# Patient Record
Sex: MALE | Race: BLACK OR AFRICAN AMERICAN | NOT HISPANIC OR LATINO | Employment: UNEMPLOYED | RURAL
[De-identification: names, ages, dates, MRNs, and addresses within clinical notes are randomized per-mention and may not be internally consistent; named-entity substitution may affect disease eponyms.]

---

## 2020-08-10 ENCOUNTER — HISTORICAL (OUTPATIENT)
Dept: ADMINISTRATIVE | Facility: HOSPITAL | Age: 60
End: 2020-08-10

## 2020-08-10 LAB — SARS-COV+SARS-COV-2 AG RESP QL IA.RAPID: NEGATIVE

## 2022-09-28 DIAGNOSIS — M25.511 RIGHT SHOULDER PAIN, UNSPECIFIED CHRONICITY: Primary | ICD-10-CM

## 2022-09-29 ENCOUNTER — HOSPITAL ENCOUNTER (OUTPATIENT)
Dept: RADIOLOGY | Facility: HOSPITAL | Age: 62
Discharge: HOME | End: 2022-09-29
Attending: ORTHOPAEDIC SURGERY
Payer: COMMERCIAL

## 2022-09-29 ENCOUNTER — OFFICE VISIT (OUTPATIENT)
Dept: ORTHOPEDICS | Facility: CLINIC | Age: 62
End: 2022-09-29
Payer: COMMERCIAL

## 2022-09-29 DIAGNOSIS — S46.811A PARTIAL TEAR OF RIGHT SUBSCAPULARIS TENDON, INITIAL ENCOUNTER: Primary | ICD-10-CM

## 2022-09-29 DIAGNOSIS — M19.019 ACROMIOCLAVICULAR JOINT ARTHRITIS, UNSPECIFIED LATERALITY: ICD-10-CM

## 2022-09-29 DIAGNOSIS — M25.511 RIGHT SHOULDER PAIN, UNSPECIFIED CHRONICITY: ICD-10-CM

## 2022-09-29 DIAGNOSIS — M75.21 BICEPS TENDONITIS ON RIGHT: ICD-10-CM

## 2022-09-29 DIAGNOSIS — M75.101 TEAR OF RIGHT SUPRASPINATUS TENDON: ICD-10-CM

## 2022-09-29 PROCEDURE — 1160F RVW MEDS BY RX/DR IN RCRD: CPT | Mod: ,,, | Performed by: ORTHOPAEDIC SURGERY

## 2022-09-29 PROCEDURE — 73030 X-RAY EXAM OF SHOULDER: CPT | Mod: 26,RT,, | Performed by: ORTHOPAEDIC SURGERY

## 2022-09-29 PROCEDURE — 73030 X-RAY EXAM OF SHOULDER: CPT | Mod: TC,RT

## 2022-09-29 PROCEDURE — 4010F PR ACE/ARB THEARPY RXD/TAKEN: ICD-10-PCS | Mod: ,,, | Performed by: ORTHOPAEDIC SURGERY

## 2022-09-29 PROCEDURE — 1159F MED LIST DOCD IN RCRD: CPT | Mod: ,,, | Performed by: ORTHOPAEDIC SURGERY

## 2022-09-29 PROCEDURE — 1159F PR MEDICATION LIST DOCUMENTED IN MEDICAL RECORD: ICD-10-PCS | Mod: ,,, | Performed by: ORTHOPAEDIC SURGERY

## 2022-09-29 PROCEDURE — 73030 XR SHOULDER COMPLETE 2 OR MORE VIEWS RIGHT: ICD-10-PCS | Mod: 26,RT,, | Performed by: ORTHOPAEDIC SURGERY

## 2022-09-29 PROCEDURE — 99205 OFFICE O/P NEW HI 60 MIN: CPT | Mod: ,,, | Performed by: ORTHOPAEDIC SURGERY

## 2022-09-29 PROCEDURE — 99205 PR OFFICE/OUTPT VISIT, NEW, LEVL V, 60-74 MIN: ICD-10-PCS | Mod: ,,, | Performed by: ORTHOPAEDIC SURGERY

## 2022-09-29 PROCEDURE — 4010F ACE/ARB THERAPY RXD/TAKEN: CPT | Mod: ,,, | Performed by: ORTHOPAEDIC SURGERY

## 2022-09-29 PROCEDURE — 1160F PR REVIEW ALL MEDS BY PRESCRIBER/CLIN PHARMACIST DOCUMENTED: ICD-10-PCS | Mod: ,,, | Performed by: ORTHOPAEDIC SURGERY

## 2022-09-29 RX ORDER — LOSARTAN POTASSIUM 50 MG/1
TABLET ORAL
COMMUNITY

## 2022-09-29 RX ORDER — GLYBURIDE-METFORMIN HYDROCHLORIDE 5; 500 MG/1; MG/1
TABLET ORAL
COMMUNITY

## 2022-09-29 RX ORDER — INSULIN GLARGINE 300 U/ML
INJECTION, SOLUTION SUBCUTANEOUS
COMMUNITY

## 2022-09-29 NOTE — PROGRESS NOTES
"      HPI:   Deni Golden is a pleasant 62 y.o. patient who reports to clinic for evaluation of right shoulder pain.  Injury onset and description: pain has been present for about a year and a half; Patient has limited, painful ROM.  Patient's occupation: retired .  This is not a work related injury.   he has had formal physical therapy back in April. He states that did help briefly.  he has not had previous shoulder injections.   he has had advanced imaging such as MRI. Last month at [facility]  The shoulder pain worsens with activity and overhead motion and driving. Pain is disruptive to sleep at night.   The pain is better with rest. Treatment thus far has included rest and activity modification. Here today to discuss diagnosis and treatment options.   VAS Pain Scale:  3  SANE Score: 75    PAST MEDICAL HISTORY:   Past Medical History:   Diagnosis Date    Type 2 diabetes mellitus with unspecified diabetic retinopathy without macular edema      PAST SURGICAL HISTORY:   Past Surgical History:   Procedure Laterality Date    HERNIA REPAIR N/A     KNEE SURGERY Left      MEDICATIONS:    Current Outpatient Medications:     glyBURIDE-metformin 5-500 mg (GLUCOVANCE) 5-500 mg Tab, TAKE 1 TABLET BY MOUTH WITH BREAKFAST AND 1 DAILY WITH LUNCH, Disp: , Rfl:     insulin glargine, TOUJEO, (TOUJEO SOLOSTAR U-300 INSULIN) 300 unit/mL (1.5 mL) InPn pen, Toujeo SoloStar U-300 Insulin 300 unit/mL (1.5 mL) subcutaneous pen  INJECT 65 UNITS SUBCUTANEOUSLY ONCE DAILY WITH SUPPER, Disp: , Rfl:     losartan (COZAAR) 50 MG tablet, losartan 50 mg tablet  TAKE 1 TABLET BY MOUTH ONCE DAILY FOR BLOOD PRESSURE, Disp: , Rfl:     pen needle, diabetic (NOVOFINE PLUS) 32 gauge x 1/6" Ndle, NovoFine Plus 32 gauge x 1/6" needle  USE 1 PEN NEEDLE EVERY DAY TO INJECT INSULIN, Disp: , Rfl:   ALLERGIES:   Review of patient's allergies indicates:  No Known Allergies  REVIEW OF SYSTEMS:  Constitution: Negative. Negative for chills, fever and night " sweats. HENT: Negative for congestion and headaches.  Eyes: Negative for blurred vision, left vision loss and right vision loss. Cardiovascular: Negative for chest pain and syncope. Respiratory: Negative for cough and shortness of breath.  Endocrine: Negative for polydipsia, polyphagia and polyuria. Hematologic/Lymphatic: Negative for bleeding problem. Does not bruise/bleed easily. Skin: Negative for dry skin, itching and rash.   Musculoskeletal: Negative for falls. Positive for hand pain and muscle weakness.     PHYSICAL EXAM:  VITAL SIGNS: There were no vitals taken for this visit.  General: Well-developed well-nourished 62 y.o. malein no acute distress;Cardiovascular: Regular rhythm by palpation of distal pulse, normal color and temperature, no concerning varicosities on symptomatic side Lungs: No labored breathing or wheezing appreciated Neuro: Alert and oriented ×3 Psychiatric: well oriented to person, place and time, demonstrates normal mood and affect Skin: No rashes, lesions or ulcers, normal temperature, turgor, and texture on uninvolved extremity    General    Nursing note and vitals reviewed.  Constitutional: He is oriented to person, place, and time. He appears well-developed and well-nourished. No distress.   HENT:   Head: Normocephalic and atraumatic.   Neck: Neck supple.   Cardiovascular:  Normal rate, regular rhythm and intact distal pulses.            Pulmonary/Chest: Effort normal. No respiratory distress. He exhibits no tenderness.   Abdominal: Soft. He exhibits no distension. There is no abdominal tenderness.   Neurological: He is alert and oriented to person, place, and time. He has normal reflexes. He displays normal reflexes. No cranial nerve deficit. He exhibits normal muscle tone.   Psychiatric: He has a normal mood and affect. His behavior is normal. Judgment and thought content normal.         Right Shoulder Exam     Inspection/Observation   Swelling: present  Scapular Dyskinesia:  positive    Tenderness   The patient is tender to palpation of the acromioclavicular joint, supraspinatus, greater tuberosity and biceps tendon.    Crepitus   The patient has crepitus of the AC joint and greater tuberosity.    Range of Motion   Active abduction:  abnormal   Extension:  abnormal   Forward Flexion:  abnormal   Forward Elevation: abnormal  Adduction: abnormal    Tests & Signs   Cross arm: positive  Drop arm: positive  Simons test: positive  Impingement: positive  Sulcus: absent  Rotator Cuff Painful Arc/Range: moderate  Anterosuperior Escape: negative  Lag Sign 90 degrees: positive  Lift Off Sign: positive  Belly Press: positive  Bear Hug: positive  Jerk Test: negative    Other   Sensation: normal    Comments:  Patient demonstrates evidence of pseudoparalysis with limited active forward elevation    Left Shoulder Exam   Left shoulder exam is normal.       Muscle Strength   Right Upper Extremity   Supraspinatus: 3/5   Subscapularis: 3/5   Biceps: 4/5     Reflexes     Right Side   Right Palacios's Sign:  absent    Vascular Exam     Right Pulses      Radial:                    2+        IMAGING:  X-ray Shoulder 2 or More Views Right    Result Date: 9/29/2022  See Procedure Notes for results. IMPRESSION: Please see Ortho procedure notes for report.  This procedure was auto-finalized by: Virtual Radiologist    Radiographs of right shoulder obtained today demonstrate the presence of moderate to severe acromioclavicular joint osteoarthritis.  Glenohumeral joint appears to be well preserved.      MRI was reviewed from [facility] demonstrate the presence of a full-thickness tear of the supraspinatus tendon is not retracted.  There also appears to be high-grade partial-thickness tearing of the subscapularis and significant fluid surrounding the biceps tendon severe acromioclavicular joint osteoarthritis is also seen    ASSESSMENT:      ICD-10-CM ICD-9-CM   1. Partial tear of right subscapularis tendon, initial  encounter  S46.811A 840.5   2. Tear of right supraspinatus tendon  M75.101 840.6   3. Biceps tendonitis on right  M75.21 726.12   4. Acromioclavicular joint arthritis, unspecified laterality  M19.019 716.91       PLAN:     -Findings and treatment options were discussed with the patient  -All questions answered  MRI findings were reviewed with the patient.  The patient has a symptomatic full-thickness rotator cuff tear with associated impingement findings.  Muscle quality is well maintained.  Treatment options were reviewed to include both operative and nonoperative measures.  The patient has failed an initial course of conservative treatment.  Given the extent and duration of the patient's complaints, operative intervention is certainly reasonable at this point.  The details of arthroscopic rotator cuff repair with subacromial decompression, biceps tenotomy versus tenodesis, labral debridement, distal clavicle excision, and possible Regeneten patch augmentation were discussed.    The patient understands the likely convalescence after surgery, in particular the expected postop rehab and recovery course. Alternatives both operative and non-operative with associated risks and benefits discussed. The outlined risks and potential complications of the proposed procedure include but are not limited to: infection, poor wound healing, scarring, stiffness, swelling, continued or recurrent pain, instability, hardware or prosthetic failure, symptomatic hardware requiring removal, weakness, neurovascular injury, numbness, chronic regional pain disorder, tissue nonhealing/irreparability/retear, contralateral ligament injury, chondral injury, arthritis, fracture, blood clot formation, inability to return to previous level of activity, anesthetic or regional block complication up to death, need for additional procedure as indicated, and potential need for further surgery.     he was also informed and understands the risks of  surgery are greater for patients with a current condition or history of heart disease, obesity, clotting disorders, recurrent infections, steroid use, current or past smoking, and factors such as sedentary lifestyle and noncompliance with medications, therapy or follow-up. The degree of the increased risk is hard to estimate with any degree of precision.    All questions were answered. The patient has verbalized understanding of these issues and wishes to proceed as discussed. The patient understands that recovery time can be up to 6-12 months.      There are no Patient Instructions on file for this visit.  No orders of the defined types were placed in this encounter.    Procedures

## 2022-10-10 ENCOUNTER — TELEPHONE (OUTPATIENT)
Dept: ORTHOPEDICS | Facility: CLINIC | Age: 62
End: 2022-10-10
Payer: COMMERCIAL

## 2022-10-10 DIAGNOSIS — M75.101 ROTATOR CUFF TEAR, RIGHT: ICD-10-CM

## 2022-10-10 DIAGNOSIS — Z01.818 PREPROCEDURAL EXAMINATION: Primary | ICD-10-CM

## 2022-10-10 DIAGNOSIS — S46.811A PARTIAL TEAR OF RIGHT SUBSCAPULARIS TENDON, INITIAL ENCOUNTER: Primary | ICD-10-CM

## 2022-10-10 RX ORDER — MUPIROCIN 20 MG/G
OINTMENT TOPICAL
Status: CANCELLED | OUTPATIENT
Start: 2022-10-10

## 2022-10-10 RX ORDER — SODIUM CHLORIDE 9 MG/ML
INJECTION, SOLUTION INTRAVENOUS CONTINUOUS
Status: CANCELLED | OUTPATIENT
Start: 2022-10-10

## 2022-10-10 NOTE — TELEPHONE ENCOUNTER
----- Message from Delia Estrella sent at 10/10/2022 10:59 AM CDT -----  Patient is ready to set up surgery. Call back [phone].Patient want to schedule surgery on 11/14.

## 2022-11-10 ENCOUNTER — CLINICAL SUPPORT (OUTPATIENT)
Dept: CARDIOLOGY | Facility: CLINIC | Age: 62
End: 2022-11-10
Payer: COMMERCIAL

## 2022-11-10 DIAGNOSIS — Z01.818 PREPROCEDURAL EXAMINATION: ICD-10-CM

## 2022-11-10 PROCEDURE — 93010 EKG 12-LEAD: ICD-10-PCS | Mod: S$PBB,,, | Performed by: STUDENT IN AN ORGANIZED HEALTH CARE EDUCATION/TRAINING PROGRAM

## 2022-11-10 PROCEDURE — 99212 OFFICE O/P EST SF 10 MIN: CPT | Mod: PBBFAC

## 2022-11-10 PROCEDURE — 93005 ELECTROCARDIOGRAM TRACING: CPT | Mod: PBBFAC | Performed by: STUDENT IN AN ORGANIZED HEALTH CARE EDUCATION/TRAINING PROGRAM

## 2022-11-10 PROCEDURE — 93010 ELECTROCARDIOGRAM REPORT: CPT | Mod: S$PBB,,, | Performed by: STUDENT IN AN ORGANIZED HEALTH CARE EDUCATION/TRAINING PROGRAM

## 2022-11-14 ENCOUNTER — HOSPITAL ENCOUNTER (OUTPATIENT)
Facility: HOSPITAL | Age: 62
Discharge: HOME | End: 2022-11-14
Attending: ORTHOPAEDIC SURGERY | Admitting: ORTHOPAEDIC SURGERY
Payer: COMMERCIAL

## 2022-11-14 ENCOUNTER — ANESTHESIA (OUTPATIENT)
Dept: SURGERY | Facility: HOSPITAL | Age: 62
End: 2022-11-14
Payer: COMMERCIAL

## 2022-11-14 ENCOUNTER — ANESTHESIA EVENT (OUTPATIENT)
Dept: SURGERY | Facility: HOSPITAL | Age: 62
End: 2022-11-14
Payer: COMMERCIAL

## 2022-11-14 VITALS
SYSTOLIC BLOOD PRESSURE: 149 MMHG | HEART RATE: 51 BPM | TEMPERATURE: 98 F | DIASTOLIC BLOOD PRESSURE: 77 MMHG | OXYGEN SATURATION: 100 % | WEIGHT: 183.63 LBS | BODY MASS INDEX: 24.87 KG/M2 | RESPIRATION RATE: 18 BRPM | HEIGHT: 72 IN

## 2022-11-14 DIAGNOSIS — M75.101 ROTATOR CUFF TEAR, RIGHT: Primary | ICD-10-CM

## 2022-11-14 DIAGNOSIS — S46.811A PARTIAL TEAR OF RIGHT SUBSCAPULARIS TENDON, INITIAL ENCOUNTER: ICD-10-CM

## 2022-11-14 LAB
GLUCOSE SERPL-MCNC: 198 MG/DL (ref 70–105)
GLUCOSE SERPL-MCNC: 201 MG/DL (ref 70–105)

## 2022-11-14 PROCEDURE — C1889 IMPLANT/INSERT DEVICE, NOC: HCPCS | Performed by: ORTHOPAEDIC SURGERY

## 2022-11-14 PROCEDURE — D9220A PRA ANESTHESIA: ICD-10-PCS | Mod: CRNA,,, | Performed by: NURSE ANESTHETIST, CERTIFIED REGISTERED

## 2022-11-14 PROCEDURE — 71000016 HC POSTOP RECOV ADDL HR: Performed by: ORTHOPAEDIC SURGERY

## 2022-11-14 PROCEDURE — 64415 NJX AA&/STRD BRCH PLXS IMG: CPT | Mod: 59,RT,, | Performed by: ANESTHESIOLOGY

## 2022-11-14 PROCEDURE — 71000015 HC POSTOP RECOV 1ST HR: Performed by: ORTHOPAEDIC SURGERY

## 2022-11-14 PROCEDURE — 29828 PR ARTHROSCOPY SHOULDER SURGICAL BICEPS TENODESIS: ICD-10-PCS | Mod: RT,,, | Performed by: ORTHOPAEDIC SURGERY

## 2022-11-14 PROCEDURE — 29824 SHO ARTHRS SRG DSTL CLAVICLC: CPT | Mod: RT,,, | Performed by: ORTHOPAEDIC SURGERY

## 2022-11-14 PROCEDURE — D9220A PRA ANESTHESIA: Mod: CRNA,,, | Performed by: NURSE ANESTHETIST, CERTIFIED REGISTERED

## 2022-11-14 PROCEDURE — 27000165 HC TUBE, ETT CUFFED: Performed by: ANESTHESIOLOGY

## 2022-11-14 PROCEDURE — 29826 SHO ARTHRS SRG DECOMPRESSION: CPT | Mod: RT,,, | Performed by: ORTHOPAEDIC SURGERY

## 2022-11-14 PROCEDURE — 76942 PR U/S GUIDANCE FOR NEEDLE GUIDANCE: ICD-10-PCS | Mod: 26,,, | Performed by: ANESTHESIOLOGY

## 2022-11-14 PROCEDURE — 25000003 PHARM REV CODE 250: Performed by: NURSE ANESTHETIST, CERTIFIED REGISTERED

## 2022-11-14 PROCEDURE — 82962 GLUCOSE BLOOD TEST: CPT

## 2022-11-14 PROCEDURE — 29827 PR SHLDR ARTHROSCOP,SURG,W/ROTAT CUFF REPR: ICD-10-PCS | Mod: RT,,, | Performed by: ORTHOPAEDIC SURGERY

## 2022-11-14 PROCEDURE — 36000711: Performed by: ORTHOPAEDIC SURGERY

## 2022-11-14 PROCEDURE — C9290 INJ, BUPIVACAINE LIPOSOME: HCPCS | Performed by: ORTHOPAEDIC SURGERY

## 2022-11-14 PROCEDURE — 37000008 HC ANESTHESIA 1ST 15 MINUTES: Performed by: ORTHOPAEDIC SURGERY

## 2022-11-14 PROCEDURE — C1713 ANCHOR/SCREW BN/BN,TIS/BN: HCPCS | Performed by: ORTHOPAEDIC SURGERY

## 2022-11-14 PROCEDURE — 37000009 HC ANESTHESIA EA ADD 15 MINS: Performed by: ORTHOPAEDIC SURGERY

## 2022-11-14 PROCEDURE — 63600175 PHARM REV CODE 636 W HCPCS: Performed by: ORTHOPAEDIC SURGERY

## 2022-11-14 PROCEDURE — 71000033 HC RECOVERY, INTIAL HOUR: Performed by: ORTHOPAEDIC SURGERY

## 2022-11-14 PROCEDURE — 27000689 HC BLADE LARYNGOSCOPE ANY SIZE: Performed by: ANESTHESIOLOGY

## 2022-11-14 PROCEDURE — 27201423 OPTIME MED/SURG SUP & DEVICES STERILE SUPPLY: Performed by: ORTHOPAEDIC SURGERY

## 2022-11-14 PROCEDURE — 29826 PR SHLDR ARTHROSCOP,PART ACROMIOPLAS: ICD-10-PCS | Mod: RT,,, | Performed by: ORTHOPAEDIC SURGERY

## 2022-11-14 PROCEDURE — 25000003 PHARM REV CODE 250: Performed by: ANESTHESIOLOGY

## 2022-11-14 PROCEDURE — 29828 SHO ARTHRS SRG BICP TENODSIS: CPT | Mod: RT,,, | Performed by: ORTHOPAEDIC SURGERY

## 2022-11-14 PROCEDURE — 76942 ECHO GUIDE FOR BIOPSY: CPT | Mod: 26,,, | Performed by: ANESTHESIOLOGY

## 2022-11-14 PROCEDURE — 76942 ECHO GUIDE FOR BIOPSY: CPT | Performed by: ANESTHESIOLOGY

## 2022-11-14 PROCEDURE — D9220A PRA ANESTHESIA: Mod: ANES,,, | Performed by: ANESTHESIOLOGY

## 2022-11-14 PROCEDURE — D9220A PRA ANESTHESIA: ICD-10-PCS | Mod: ANES,,, | Performed by: ANESTHESIOLOGY

## 2022-11-14 PROCEDURE — 29827 SHO ARTHRS SRG RT8TR CUF RPR: CPT | Mod: RT,,, | Performed by: ORTHOPAEDIC SURGERY

## 2022-11-14 PROCEDURE — 63600175 PHARM REV CODE 636 W HCPCS: Performed by: NURSE ANESTHETIST, CERTIFIED REGISTERED

## 2022-11-14 PROCEDURE — 29824 PR SHLDR ARTHROSCOP,SURG,DIS CLAVICULECTOMY: ICD-10-PCS | Mod: RT,,, | Performed by: ORTHOPAEDIC SURGERY

## 2022-11-14 PROCEDURE — 36000710: Performed by: ORTHOPAEDIC SURGERY

## 2022-11-14 PROCEDURE — 97161 PT EVAL LOW COMPLEX 20 MIN: CPT

## 2022-11-14 PROCEDURE — 64415 PR NERVE BLOCK INJ, ANES/STEROID, BRACHIAL PLEXUS, INCL IMAG GUIDANCE: ICD-10-PCS | Mod: 59,RT,, | Performed by: ANESTHESIOLOGY

## 2022-11-14 PROCEDURE — 27000655: Performed by: ANESTHESIOLOGY

## 2022-11-14 DEVICE — ANCHOR TENDON 8: Type: IMPLANTABLE DEVICE | Site: SHOULDER | Status: FUNCTIONAL

## 2022-11-14 DEVICE — IMPLANT ARTHSCP BIOINDUCTV LG: Type: IMPLANTABLE DEVICE | Site: SHOULDER | Status: FUNCTIONAL

## 2022-11-14 DEVICE — ANCHOR BONE ARTHSCP DEL SYS: Type: IMPLANTABLE DEVICE | Site: SHOULDER | Status: FUNCTIONAL

## 2022-11-14 RX ORDER — DEXAMETHASONE SODIUM PHOSPHATE 4 MG/ML
INJECTION, SOLUTION INTRA-ARTICULAR; INTRALESIONAL; INTRAMUSCULAR; INTRAVENOUS; SOFT TISSUE
Status: DISCONTINUED | OUTPATIENT
Start: 2022-11-14 | End: 2022-11-14

## 2022-11-14 RX ORDER — ONDANSETRON 2 MG/ML
4 INJECTION INTRAMUSCULAR; INTRAVENOUS DAILY PRN
Status: DISCONTINUED | OUTPATIENT
Start: 2022-11-14 | End: 2022-11-14 | Stop reason: HOSPADM

## 2022-11-14 RX ORDER — MIDAZOLAM HYDROCHLORIDE 1 MG/ML
INJECTION INTRAMUSCULAR; INTRAVENOUS
Status: DISCONTINUED | OUTPATIENT
Start: 2022-11-14 | End: 2022-11-14

## 2022-11-14 RX ORDER — OXYCODONE HYDROCHLORIDE 5 MG/1
10 TABLET ORAL EVERY 4 HOURS PRN
Status: DISCONTINUED | OUTPATIENT
Start: 2022-11-14 | End: 2022-11-14 | Stop reason: HOSPADM

## 2022-11-14 RX ORDER — ONDANSETRON 2 MG/ML
INJECTION INTRAMUSCULAR; INTRAVENOUS
Status: DISCONTINUED | OUTPATIENT
Start: 2022-11-14 | End: 2022-11-14

## 2022-11-14 RX ORDER — ACETAMINOPHEN 10 MG/ML
1000 INJECTION, SOLUTION INTRAVENOUS ONCE
Status: DISCONTINUED | OUTPATIENT
Start: 2022-11-14 | End: 2022-11-14 | Stop reason: HOSPADM

## 2022-11-14 RX ORDER — MUPIROCIN 20 MG/G
OINTMENT TOPICAL
Status: DISCONTINUED | OUTPATIENT
Start: 2022-11-14 | End: 2022-11-14 | Stop reason: HOSPADM

## 2022-11-14 RX ORDER — OXYCODONE HYDROCHLORIDE 5 MG/1
5 TABLET ORAL EVERY 4 HOURS PRN
Status: DISCONTINUED | OUTPATIENT
Start: 2022-11-14 | End: 2022-11-14 | Stop reason: HOSPADM

## 2022-11-14 RX ORDER — DIPHENHYDRAMINE HYDROCHLORIDE 50 MG/ML
25 INJECTION INTRAMUSCULAR; INTRAVENOUS EVERY 6 HOURS PRN
Status: DISCONTINUED | OUTPATIENT
Start: 2022-11-14 | End: 2022-11-14 | Stop reason: HOSPADM

## 2022-11-14 RX ORDER — EPINEPHRINE CONVENIENCE KIT 1 MG/ML(1)
KIT INTRAMUSCULAR; SUBCUTANEOUS
Status: DISCONTINUED | OUTPATIENT
Start: 2022-11-14 | End: 2022-11-14 | Stop reason: HOSPADM

## 2022-11-14 RX ORDER — PROMETHAZINE HYDROCHLORIDE 25 MG/1
25 TABLET ORAL EVERY 6 HOURS PRN
Status: DISCONTINUED | OUTPATIENT
Start: 2022-11-14 | End: 2022-11-14 | Stop reason: HOSPADM

## 2022-11-14 RX ORDER — EPHEDRINE SULFATE 50 MG/ML
INJECTION, SOLUTION INTRAVENOUS
Status: DISCONTINUED | OUTPATIENT
Start: 2022-11-14 | End: 2022-11-14

## 2022-11-14 RX ORDER — DOCUSATE SODIUM 100 MG/1
100 CAPSULE, LIQUID FILLED ORAL 2 TIMES DAILY
Status: DISCONTINUED | OUTPATIENT
Start: 2022-11-14 | End: 2022-11-14 | Stop reason: HOSPADM

## 2022-11-14 RX ORDER — MEPERIDINE HYDROCHLORIDE 25 MG/ML
25 INJECTION INTRAMUSCULAR; INTRAVENOUS; SUBCUTANEOUS ONCE AS NEEDED
Status: DISCONTINUED | OUTPATIENT
Start: 2022-11-14 | End: 2022-11-14 | Stop reason: HOSPADM

## 2022-11-14 RX ORDER — CEFAZOLIN SODIUM 2 G/50ML
2 SOLUTION INTRAVENOUS
Status: COMPLETED | OUTPATIENT
Start: 2022-11-14 | End: 2022-11-14

## 2022-11-14 RX ORDER — LIDOCAINE HYDROCHLORIDE 20 MG/ML
INJECTION, SOLUTION EPIDURAL; INFILTRATION; INTRACAUDAL; PERINEURAL
Status: DISCONTINUED | OUTPATIENT
Start: 2022-11-14 | End: 2022-11-14

## 2022-11-14 RX ORDER — SODIUM CHLORIDE 0.9 % (FLUSH) 0.9 %
2 SYRINGE (ML) INJECTION
Status: DISCONTINUED | OUTPATIENT
Start: 2022-11-14 | End: 2022-11-14 | Stop reason: HOSPADM

## 2022-11-14 RX ORDER — HYDROMORPHONE HYDROCHLORIDE 2 MG/ML
0.5 INJECTION, SOLUTION INTRAMUSCULAR; INTRAVENOUS; SUBCUTANEOUS EVERY 5 MIN PRN
Status: DISCONTINUED | OUTPATIENT
Start: 2022-11-14 | End: 2022-11-14 | Stop reason: HOSPADM

## 2022-11-14 RX ORDER — BUPIVACAINE HYDROCHLORIDE 5 MG/ML
INJECTION, SOLUTION EPIDURAL; INTRACAUDAL
Status: COMPLETED | OUTPATIENT
Start: 2022-11-14 | End: 2022-11-14

## 2022-11-14 RX ORDER — FENTANYL CITRATE 50 UG/ML
INJECTION, SOLUTION INTRAMUSCULAR; INTRAVENOUS
Status: DISCONTINUED | OUTPATIENT
Start: 2022-11-14 | End: 2022-11-14

## 2022-11-14 RX ORDER — ROCURONIUM BROMIDE 10 MG/ML
INJECTION, SOLUTION INTRAVENOUS
Status: DISCONTINUED | OUTPATIENT
Start: 2022-11-14 | End: 2022-11-14

## 2022-11-14 RX ORDER — PROPOFOL 10 MG/ML
VIAL (ML) INTRAVENOUS
Status: DISCONTINUED | OUTPATIENT
Start: 2022-11-14 | End: 2022-11-14

## 2022-11-14 RX ORDER — ONDANSETRON 4 MG/1
8 TABLET, ORALLY DISINTEGRATING ORAL EVERY 8 HOURS PRN
Status: DISCONTINUED | OUTPATIENT
Start: 2022-11-14 | End: 2022-11-14 | Stop reason: HOSPADM

## 2022-11-14 RX ORDER — MORPHINE SULFATE 10 MG/ML
4 INJECTION INTRAMUSCULAR; INTRAVENOUS; SUBCUTANEOUS EVERY 5 MIN PRN
Status: DISCONTINUED | OUTPATIENT
Start: 2022-11-14 | End: 2022-11-14 | Stop reason: HOSPADM

## 2022-11-14 RX ORDER — OXYCODONE AND ACETAMINOPHEN 10; 325 MG/1; MG/1
1 TABLET ORAL EVERY 6 HOURS PRN
Qty: 30 TABLET | Refills: 0 | Status: SHIPPED | OUTPATIENT
Start: 2022-11-14 | End: 2022-11-21

## 2022-11-14 RX ORDER — ONDANSETRON 4 MG/1
4 TABLET, ORALLY DISINTEGRATING ORAL EVERY 8 HOURS PRN
Qty: 30 TABLET | Refills: 0 | Status: SHIPPED | OUTPATIENT
Start: 2022-11-14

## 2022-11-14 RX ORDER — SODIUM CHLORIDE 9 MG/ML
INJECTION, SOLUTION INTRAVENOUS CONTINUOUS
Status: DISCONTINUED | OUTPATIENT
Start: 2022-11-14 | End: 2022-11-14 | Stop reason: HOSPADM

## 2022-11-14 RX ORDER — IPRATROPIUM BROMIDE AND ALBUTEROL SULFATE 2.5; .5 MG/3ML; MG/3ML
3 SOLUTION RESPIRATORY (INHALATION) ONCE AS NEEDED
Status: DISCONTINUED | OUTPATIENT
Start: 2022-11-14 | End: 2022-11-14 | Stop reason: HOSPADM

## 2022-11-14 RX ADMIN — ROCURONIUM BROMIDE 50 MG: 10 INJECTION INTRAVENOUS at 12:11

## 2022-11-14 RX ADMIN — DEXAMETHASONE SODIUM PHOSPHATE 8 MG: 4 INJECTION, SOLUTION INTRA-ARTICULAR; INTRALESIONAL; INTRAMUSCULAR; INTRAVENOUS; SOFT TISSUE at 12:11

## 2022-11-14 RX ADMIN — FENTANYL CITRATE 100 MCG: 50 INJECTION INTRAMUSCULAR; INTRAVENOUS at 12:11

## 2022-11-14 RX ADMIN — SODIUM CHLORIDE, POTASSIUM CHLORIDE, SODIUM LACTATE AND CALCIUM CHLORIDE: 600; 310; 30; 20 INJECTION, SOLUTION INTRAVENOUS at 12:11

## 2022-11-14 RX ADMIN — LIDOCAINE HYDROCHLORIDE 75 MG: 20 INJECTION, SOLUTION EPIDURAL; INFILTRATION; INTRACAUDAL; PERINEURAL at 12:11

## 2022-11-14 RX ADMIN — BUPIVACAINE HYDROCHLORIDE 5 ML: 5 INJECTION, SOLUTION EPIDURAL; INTRACAUDAL; PERINEURAL at 12:11

## 2022-11-14 RX ADMIN — PROPOFOL 200 MG: 10 INJECTION, EMULSION INTRAVENOUS at 12:11

## 2022-11-14 RX ADMIN — CEFAZOLIN SODIUM 2 G: 1 INJECTION, POWDER, FOR SOLUTION INTRAMUSCULAR; INTRAVENOUS at 12:11

## 2022-11-14 RX ADMIN — MIDAZOLAM 2 MG: 1 INJECTION INTRAMUSCULAR; INTRAVENOUS at 12:11

## 2022-11-14 RX ADMIN — BUPIVACAINE 10 ML: 13.3 INJECTION, SUSPENSION, LIPOSOMAL INFILTRATION at 12:11

## 2022-11-14 RX ADMIN — EPHEDRINE SULFATE 10 MG: 50 INJECTION INTRAVENOUS at 01:11

## 2022-11-14 RX ADMIN — EPHEDRINE SULFATE 25 MG: 50 INJECTION INTRAVENOUS at 12:11

## 2022-11-14 RX ADMIN — EPHEDRINE SULFATE 15 MG: 50 INJECTION INTRAVENOUS at 01:11

## 2022-11-14 RX ADMIN — ROCURONIUM BROMIDE 20 MG: 10 INJECTION INTRAVENOUS at 01:11

## 2022-11-14 RX ADMIN — SODIUM CHLORIDE, POTASSIUM CHLORIDE, SODIUM LACTATE AND CALCIUM CHLORIDE: 600; 310; 30; 20 INJECTION, SOLUTION INTRAVENOUS at 02:11

## 2022-11-14 RX ADMIN — SUGAMMADEX 200 MG: 100 INJECTION, SOLUTION INTRAVENOUS at 02:11

## 2022-11-14 RX ADMIN — ONDANSETRON 4 MG: 2 INJECTION INTRAMUSCULAR; INTRAVENOUS at 12:11

## 2022-11-14 NOTE — TRANSFER OF CARE
Anesthesia Transfer of Care Note    Patient: Deni Golden    Procedure(s) Performed: Procedure(s) (LRB):  REPAIR, ROTATOR CUFF, ARTHROSCOPIC (Right)  ARTHROSCOPY, SHOULDER, WITH SUBACROMIAL SPACE DECOMPRESSION (Right)  ARTHROSCOPY,SHOULDER,WITH BICEPS TENODESIS (Right)  ARTHROSCOPY, SHOULDER, WITH DISTAL CLAVICLE EXCISION (Right)    Patient location: PACU    Anesthesia Type: general    Transport from OR: Transported from OR on 100% O2 by closed face mask with adequate spontaneous ventilation    Post pain: adequate analgesia    Post assessment: no apparent anesthetic complications    Post vital signs: stable    Level of consciousness: responds to stimulation    Nausea/Vomiting: no nausea/vomiting    Complications: none    Transfer of care protocol was followed      Last vitals:   Visit Vitals  BP (!) 149/73   Pulse 66   Temp 36.4 °C (97.5 °F) (Oral)   Resp 16   Ht 6' (1.829 m)   Wt 83.3 kg (183 lb 9.6 oz)   SpO2 98%   BMI 24.90 kg/m²

## 2022-11-14 NOTE — PLAN OF CARE
[facility]  Medical Patch        Ultra sling for 2 weeks  Regular sling for weeks 2-3    If biceps tenodesis is also performed, then all ROM for weeks 0-6 performed w/ elbow flexed  Phase 0- QUIET     Week 0-1    Quiet in sling with elbow/wrist/hand       Begin active scapular retraction/protraction exercises with therapist cueing    Phase 1 - PASSIVE   Pendulums to warm-up beginning week 1    Week 1-4    Supine External Rotation - 0°-30° beginning at 2 weeks with progression  to full PROM by 8 weeks        Supine Forward Elevation - 0°-90° beginning at 2 weeks with progression       to full PROM by 8 weeks    Phase 2 - ACTIVE   Pendulums to warm-up       Active ROM with terminal stretch    Week 5-8    Supine Forward Elevation - after 6 weeks, progress gradually to full       Supine External Rotation - after 6 weeks, progress gradually to full  Internal Rotation - Full (begin behind the back)       Begin AROM in supine and progress to upright    Phase 3 - RESISTED  Pendulums to warm-up and continue with phase 2       Week 8    External and Internal Rotation        Seated Rows  Standing forward punch       Bicep Curls         Weight Training    Week 10-12    Keep hands within eyesight, keep elbows bent, no long lever arms       Minimize overhead activities (below shoulder)       (No  press, pull-down behind head, or wide  bench)    Initiation of Interval Sport Programs  Golf      3 months (chip and putt only)  Tennis      4 months  Skiing     3-4 months

## 2022-11-14 NOTE — ANESTHESIA PROCEDURE NOTES
Intubation    Date/Time: 11/14/2022 12:45 PM  Performed by: Jacquie Coyle CRNA  Authorized by: Juan Walters     Intubation:     Induction:  Intravenous    Intubated:  Postinduction    Mask Ventilation:  Easy mask    Attempts:  1    Attempted By:  CRNA    Method of Intubation:  Direct    Blade:  Nick 3    Laryngeal View Grade: Grade I - full view of cords      Difficult Airway Encountered?: No      Complications:  None    Airway Device:  Oral endotracheal tube    Airway Device Size:  7.5    Style/Cuff Inflation:  Cuffed (inflated to minimal occlusive pressure)    Tube secured:  22    Secured at:  The lips    Placement Verified By:  Capnometry    Complicating Factors:  None    Findings Post-Intubation:  BS equal bilateral and atraumatic/condition of teeth unchanged

## 2022-11-14 NOTE — PT/OT/SLP EVAL
Physical Therapy Evaluation    Patient Name:  Deni Golden   MRN:  [id number]    Recommendations:     Discharge Recommendations:  outpatient PT   Discharge Equipment Recommendations: none   Barriers to discharge: None    Assessment:     Deni Golden is a 62 y.o. male admitted with a medical diagnosis of Rotator cuff tear, right.  He presents with the following impairments/functional limitations:  decreased ROM Patient with good understanding of post op education.    Rehab Prognosis: Good; patient would benefit from acute skilled PT services to address these deficits and reach maximum level of function.    Recent Surgery: Procedure(s) (LRB):  REPAIR, ROTATOR CUFF, ARTHROSCOPIC (Right)  ARTHROSCOPY, SHOULDER, WITH SUBACROMIAL SPACE DECOMPRESSION (Right)  ARTHROSCOPY,SHOULDER,WITH BICEPS TENODESIS (Right)  ARTHROSCOPY, SHOULDER, WITH DISTAL CLAVICLE EXCISION (Right) Day of Surgery    Plan:     During this hospitalization, patient to be seen 1 x/week to address the identified rehab impairments via gait training, therapeutic activities and progress toward the following goals:    Plan of Care Expires:  11/14/22    Subjective     Chief Complaint: none  Patient/Family Comments/goals: Plan is to go home today  Pain/Comfort:  Pain Rating 1: 0/10    Patients cultural, spiritual, [employer] conflicts given the current situation:      Living Environment:  Lives with family  Prior to admission, patients level of function was independent.  Equipment used at home: none.  DME owned (not currently used): none.  Upon discharge, patient will have assistance from spouse.    Objective:     Communicated with nurse prior to session.  Patient found supine with    upon PT entry to room.    General Precautions: Standard, fall   Orthopedic Precautions:    Braces: Shoulder abduction brace  Respiratory Status: Room air    Exams:  Cognitive Exam:  Patient is oriented to Person    Functional Mobility:  Bed Mobility:     Supine to Sit:  contact guard assistance  Sit to Supine: contact guard assistance  Transfers:     Sit to Stand:  contact guard assistance with no AD  Gait: ambulated 10 feet independent      AM-PAC 6 CLICK MOBILITY  Total Score:18       Treatment & Education:  HEP: patch/bicep protocol, don/doff abd pillow, don/doff shirt    Patient left supine with call button in reach.    GOALS:   Multidisciplinary Problems       Physical Therapy Goals          Problem: Physical Therapy    Goal Priority Disciplines Outcome Goal Variances Interventions   Physical Therapy Goal     PT, PT/OT Ongoing, Progressing     Description: Short Term Goals  Independent with don/doff abd pillow  Independent with HEP    Long Term Goals  Independent with upper extremity dressing/ADL's                          History:     Past Medical History:   Diagnosis Date    Type 2 diabetes mellitus with unspecified diabetic retinopathy without macular edema        Past Surgical History:   Procedure Laterality Date    HERNIA REPAIR N/A     KNEE SURGERY Left        Time Tracking:     PT Received On: 11/14/22  PT Start Time: 1400  PT Stop Time: 1415  PT Total Time (min): 25 min     Billable Minutes: Evaluation 20 11/14/2022

## 2022-11-14 NOTE — PLAN OF CARE
1503  pt to pacu pt resp reg and non labored pt dsg d/I to r shoulder pt sao2 100% on  7l fm pt pain level 0 at present will monitor accu check 201 md aware    1533 pt vs stable pt resting well pt sao2 100% on ra pt voices no complaints orders to release to room per md    1540 pt released to e elisabet rn b/p 140/73 pulse 60 resp 16 sao2 97% on ra

## 2022-11-14 NOTE — H&P
"H&P completed  has been reviewed, the patient has been examined and:  I concur with the findings and no changes have occurred since H&P was written.    There are no hospital problems to display for this patient.          HPI:   Deni Golden is a pleasant 62 y.o. patient who reports to clinic for evaluation of right shoulder pain.  Injury onset and description: pain has been present for about a year and a half; Patient has limited, painful ROM.  Patient's occupation: retired .  This is not a work related injury.   he has had formal physical therapy back in April. He states that did help briefly.  he has not had previous shoulder injections.   he has had advanced imaging such as MRI. Last month at [place]  The shoulder pain worsens with activity and overhead motion and driving. Pain is disruptive to sleep at night.   The pain is better with rest. Treatment thus far has included rest and activity modification. Here today to discuss diagnosis and treatment options.   VAS Pain Scale:  3  SANE Score: 75     PAST MEDICAL HISTORY:        Past Medical History:   Diagnosis Date    Type 2 diabetes mellitus with unspecified diabetic retinopathy without macular edema        PAST SURGICAL HISTORY:         Past Surgical History:   Procedure Laterality Date    HERNIA REPAIR N/A      KNEE SURGERY Left        MEDICATIONS:     Current Outpatient Medications:     glyBURIDE-metformin 5-500 mg (GLUCOVANCE) 5-500 mg Tab, TAKE 1 TABLET BY MOUTH WITH BREAKFAST AND 1 DAILY WITH LUNCH, Disp: , Rfl:     insulin glargine, TOUJEO, (TOUJEO SOLOSTAR U-300 INSULIN) 300 unit/mL (1.5 mL) InPn pen, Toujeo SoloStar U-300 Insulin 300 unit/mL (1.5 mL) subcutaneous pen  INJECT 65 UNITS SUBCUTANEOUSLY ONCE DAILY WITH SUPPER, Disp: , Rfl:     losartan (COZAAR) 50 MG tablet, losartan 50 mg tablet  TAKE 1 TABLET BY MOUTH ONCE DAILY FOR BLOOD PRESSURE, Disp: , Rfl:     pen needle, diabetic (NOVOFINE PLUS) 32 gauge x 1/6" Ndle, NovoFine Plus 32 gauge " "x 1/6" needle  USE 1 PEN NEEDLE EVERY DAY TO INJECT INSULIN, Disp: , Rfl:   ALLERGIES:   Review of patient's allergies indicates:  No Known Allergies  REVIEW OF SYSTEMS:  Constitution: Negative. Negative for chills, fever and night sweats. HENT: Negative for congestion and headaches.  Eyes: Negative for blurred vision, left vision loss and right vision loss. Cardiovascular: Negative for chest pain and syncope. Respiratory: Negative for cough and shortness of breath.  Endocrine: Negative for polydipsia, polyphagia and polyuria. Hematologic/Lymphatic: Negative for bleeding problem. Does not bruise/bleed easily. Skin: Negative for dry skin, itching and rash.   Musculoskeletal: Negative for falls. Positive for hand pain and muscle weakness.      PHYSICAL EXAM:  VITAL SIGNS: There were no vitals taken for this visit.  General: Well-developed well-nourished 62 y.o. malein no acute distress;Cardiovascular: Regular rhythm by palpation of distal pulse, normal color and temperature, no concerning varicosities on symptomatic side Lungs: No labored breathing or wheezing appreciated Neuro: Alert and oriented ×3 Psychiatric: well oriented to person, place and time, demonstrates normal mood and affect Skin: No rashes, lesions or ulcers, normal temperature, turgor, and texture on uninvolved extremity     General     Nursing note and vitals reviewed.  Constitutional: He is oriented to person, place, and time. He appears well-developed and well-nourished. No distress.   HENT:   Head: Normocephalic and atraumatic.   Neck: Neck supple.   Cardiovascular:  Normal rate, regular rhythm and intact distal pulses.            Pulmonary/Chest: Effort normal. No respiratory distress. He exhibits no tenderness.   Abdominal: Soft. He exhibits no distension. There is no abdominal tenderness.   Neurological: He is alert and oriented to person, place, and time. He has normal reflexes. He displays normal reflexes. No cranial nerve deficit. He exhibits " normal muscle tone.   Psychiatric: He has a normal mood and affect. His behavior is normal. Judgment and thought content normal.            Right Shoulder Exam      Inspection/Observation   Swelling: present  Scapular Dyskinesia: positive     Tenderness   The patient is tender to palpation of the acromioclavicular joint, supraspinatus, greater tuberosity and biceps tendon.     Crepitus   The patient has crepitus of the AC joint and greater tuberosity.     Range of Motion   Active abduction:  abnormal   Extension:  abnormal   Forward Flexion:  abnormal   Forward Elevation: abnormal  Adduction: abnormal     Tests & Signs   Cross arm: positive  Drop arm: positive  Simons test: positive  Impingement: positive  Sulcus: absent  Rotator Cuff Painful Arc/Range: moderate  Anterosuperior Escape: negative  Lag Sign 90 degrees: positive  Lift Off Sign: positive  Belly Press: positive  Bear Hug: positive  Jerk Test: negative     Other   Sensation: normal     Comments:  Patient demonstrates evidence of pseudoparalysis with limited active forward elevation     Left Shoulder Exam   Left shoulder exam is normal.         Muscle Strength   Right Upper Extremity   Supraspinatus: 3/5   Subscapularis: 3/5   Biceps: 4/5      Reflexes      Right Side   Right Palacios's Sign:  absent     Vascular Exam      Right Pulses        Radial:                    2+           IMAGING:  X-ray Shoulder 2 or More Views Right     Result Date: 9/29/2022  See Procedure Notes for results. IMPRESSION: Please see Ortho procedure notes for report.  This procedure was auto-finalized by: Virtual Radiologist     Radiographs of right shoulder obtained today demonstrate the presence of moderate to severe acromioclavicular joint osteoarthritis.  Glenohumeral joint appears to be well preserved.      MRI was reviewed from [facility] demonstrate the presence of a full-thickness tear of the supraspinatus tendon is not retracted.  There also appears to be high-grade  partial-thickness tearing of the subscapularis and significant fluid surrounding the biceps tendon severe acromioclavicular joint osteoarthritis is also seen     ASSESSMENT:        ICD-10-CM ICD-9-CM   1. Partial tear of right subscapularis tendon, initial encounter  S46.811A 840.5   2. Tear of right supraspinatus tendon  M75.101 840.6   3. Biceps tendonitis on right  M75.21 726.12   4. Acromioclavicular joint arthritis, unspecified laterality  M19.019 716.91         PLAN:     -Findings and treatment options were discussed with the patient  -All questions answered  MRI findings were reviewed with the patient.  The patient has a symptomatic full-thickness rotator cuff tear with associated impingement findings.  Muscle quality is well maintained.  Treatment options were reviewed to include both operative and nonoperative measures.  The patient has failed an initial course of conservative treatment.  Given the extent and duration of the patient's complaints, operative intervention is certainly reasonable at this point.  The details of arthroscopic rotator cuff repair with subacromial decompression, biceps tenotomy versus tenodesis, labral debridement, distal clavicle excision, and possible Regeneten patch augmentation were discussed.     The patient understands the likely convalescence after surgery, in particular the expected postop rehab and recovery course. Alternatives both operative and non-operative with associated risks and benefits discussed. The outlined risks and potential complications of the proposed procedure include but are not limited to: infection, poor wound healing, scarring, stiffness, swelling, continued or recurrent pain, instability, hardware or prosthetic failure, symptomatic hardware requiring removal, weakness, neurovascular injury, numbness, chronic regional pain disorder, tissue nonhealing/irreparability/retear, contralateral ligament injury, chondral injury, arthritis, fracture, blood clot  formation, inability to return to previous level of activity, anesthetic or regional block complication up to death, need for additional procedure as indicated, and potential need for further surgery.      he was also informed and understands the risks of surgery are greater for patients with a current condition or history of heart disease, obesity, clotting disorders, recurrent infections, steroid use, current or past smoking, and factors such as sedentary lifestyle and noncompliance with medications, therapy or follow-up. The degree of the increased risk is hard to estimate with any degree of precision.     All questions were answered. The patient has verbalized understanding of these issues and wishes to proceed as discussed. The patient understands that recovery time can be up to 6-12 months.        There are no Patient Instructions on file for this visit.  No orders of the defined types were placed in this encounter.     Procedures

## 2022-11-14 NOTE — OP NOTE
Surgery Date: 11/14/2022      Surgeon(s) and Role:     * Pierre Stewart MD - Primary    Assistant: none    Pre-op Diagnosis:   Partial tear of right subscapularis tendon, initial encounter [S46.386I]     Post-op Diagnosis:    Adhesive capsulitis right shoulder  Partial-thickness bursal sided tearing of the supraspinatus  SLAP tear  Distal clavicle osteoarthritis  Subacromial impingement  Procedure:  Procedure(s) (LRB):  REPAIR, ROTATOR CUFF, ARTHROSCOPIC (Right)  ARTHROSCOPY, SHOULDER, WITH SUBACROMIAL SPACE DECOMPRESSION (Right)  ARTHROSCOPY,SHOULDER,WITH BICEPS TENODESIS (Right)  ARTHROSCOPY, SHOULDER, WITH DISTAL CLAVICLE EXCISION (Right)   Extensive debridement of the glenohumeral joint  Right shoulder manipulation under anesthesia     Anesthesia:  General + interscalene block    EBL: 5 cc    Implants:   Implant Name Type Inv. Item Serial No.  Lot No. LRB No. Used Action   ANCHOR BIOCOMP SWVLLOK - [id number]  ANCHOR BIOCOMP SWVLLOK  [employer] [id number] Right 1 Wasted   ANCHOR BIOCOMP SWVLLOK - [id number]  ANCHOR BIOCOMP SWVLLOK  [employer] [id number] Right 1 Implanted   ANCHOR BONE ARTHSCP DEL SYS - [id number]  ANCHOR BONE ARTHSCP DEL SYS  [employer] [id number] Right 1 Implanted   IMPLANT ARTHSCP BIOINDUCTV LG - [id number]  IMPLANT ARTHSCP BIOINDUCTV LG  [facility] [employer] [id number] Right 1 Implanted   ANCHOR TENDON 8 - [id number]  ANCHOR TENDON 8  [employer] [id number] Right 1 Implanted       Description of findings:  see below    Complication:   none        Procedure: The patient was taken to the operating room and was initially placed supine.  Anesthesia was administered, as was an interscalene block, and pre-operative antibiotics were given.  A timeout was performed. The patient was then placed in the lateral decubitus position on a bean bag.  All bony prominences were well padded.  An axillary roll was created and appropriately placed.  The arm was then prepped and draped in the usual sterile fashion.  The arm was placed into a spider arm mendez.  A standard posterior portal was then undertaken and a diagnostic arthroscopy was performed.   Within the shoulder there was noted to be extensive synovitis.  Synovitis was surrounding the biceps insertion.  There was a type 2 slap tear also seen.  The rotator interval appeared to be thickened consistent with adhesive capsulitis.  Anterior portal was established the biceps was tenotomized.  The thickened coracohumeral ligament middle glenohumeral ligament and superior glenohumeral ligament were carefully released.  Partial release of the inferior glenohumeral ligament was performed.  There was evidence of osteoarthritis present along the glenoid and chondromalacia seen anterior inferiorly as well as a labral tear inferiorly.  This debrided with a motorized shaver the chondromalacia was smoothed down to a stable base as was the anterior labral tear.  The superior labrum and posterior labrum were likewise debrided with a motorized shaver as well.  Subscapularis was found to be intact.  Partial-thickness tearing of the supraspinatus was noted and debrided with a motorized shaver as well.  The arm was taken out of the arm mendez and taken through an arc of motion at this point any other adhesions were released carefully utilizing a manipulation under anesthesia the arm was replaced back into the arm mendez and the arthroscope was once again put into the glenohumeral joint and inspected.  This completed the extensive debridement of the glenohumeral joint and manipulation under anesthesia.    The arthroscope was then inserted into the subacromial space.  A lateral portal was established 2-3 fingerbreadths lateral to the anterolateral edge of the acromion.  The rotator cuff was then visualized.  Partial bursal sided irregularity was seen with this was less than 10-20% of the tendon thickness.  This involved the leading edge of the supraspinatus.    Attention was then turned  towards the biceps tendon.  The transverse humeral ligament was identified and released.  The biceps was noted to have synovitis surrounding the biceps tendon.  The biceps was then exteriorized after the bicipital groove had been thoroughly debrided and decorticated.  2 cm of biceps tendon was excised.  A 2. FiberWire suture was used to create a Krackow suture configuration in the biceps tendon.  The biceps tendon and the corresponding suture strand were coupled to a 4.75 mm BioComposite SwiveLock anchor.   hole was created high within the bicipital groove in SwiveLock anchor was introduced and seated appropriately to complete the biceps tenodesis in an onlay suprapectoral fashion        The undersurface of the acromion was inspected.  The acromion was classified as Type 3 .  A posterior cutting block technique was utilized to perform a subacromial decompression.  The posterior border of the distal clavicle was used a reference point for a complete decompression, as the acromion was converted from a curved type II-III acromion to a flat, type I acromion.     The distal clavicle was then inspected.  The distal clavicle demonstrated marked arthrosis and narrowing.  A distal clavicle excision was then performed, resecting 8-10 mm of distal clavicle with an arthroscopic khushboo. The superior and posterior acromioclavicular ligaments were protected. A smooth resection and thorough decompression was noted at final inspection.    Attention was turned back towards the rotator cuff.  Partial-thickness tearing did not necessitate a full takedown but rotator cuff augmentation with a Regeneten patch was favored.    The collagen vascular patch was introduced through a lateral portal.  This was laid on top of the supraspinatus and infraspinatus tendons to cover the partial articular sided tear which were present.  Combination of PLLA and PEEK bone staples were utilized to fixate the Regeneten patch in satisfactory position.   This completed the partial rotator cuff repair.    This completed the procedure.  Final pictures were taken, and all instruments were removed. Portals were closed with 3-0 monocryl.  Steri-strips were applied, sterile dressings were placed, and the patient was placed into a shoulder abduction pillow sling.           Disposition:awakened from anesthesia, extubated and taken to the recovery room in a stable condition, having suffered no apparent untoward event.

## 2022-11-14 NOTE — PLAN OF CARE
Problem: Physical Therapy  Goal: Physical Therapy Goal  Description: Short Term Goals  Independent with don/doff abd pillow  Independent with HEP    Long Term Goals  Independent with upper extremity dressing/ADL's     Outcome: Ongoing, Progressing

## 2022-11-14 NOTE — ANESTHESIA PREPROCEDURE EVALUATION
11/14/2022  Deni Golden is a 62 y.o., male.      Pre-op Assessment    I have reviewed the Patient Summary Reports.     I have reviewed the Nursing Notes. I have reviewed the NPO Status.   I have reviewed the Medications.     Review of Systems  Anesthesia Hx:  No problems with previous Anesthesia  Denies Family Hx of Anesthesia complications.   Denies Personal Hx of Anesthesia complications.   Social:  Non-Smoker, No Alcohol Use    Cardiovascular:   Exercise tolerance: good Hypertension ECG has been reviewed.    Endocrine:   Diabetes, type 2        Physical Exam  General: Well nourished, Cooperative and Alert    Airway:  Mallampati: II   Mouth Opening: Normal  TM Distance: Normal  Tongue: Normal  Neck ROM: Normal ROM    Dental:  Intact    Chest/Lungs:  Clear to auscultation, Normal Respiratory Rate    Heart:  Rate: Normal  Rhythm: Regular Rhythm        Chemistry        Component Value Date/Time     11/10/2022 0953    K 4.3 11/10/2022 0953     11/10/2022 0953    CO2 30 11/10/2022 0953    BUN 14 11/10/2022 0953    CREATININE 1.30 11/10/2022 0953     (H) 11/10/2022 0953        Component Value Date/Time    CALCIUM 9.3 11/10/2022 0953      No results found for: WBC, RBC, HGB, MCV, MCH, MCHC, RDW, PLT, MPV, GRAN, LYMPH, MONO, EOS, BASO  Results for orders placed or performed in visit on 11/10/22   EKG 12-lead    Collection Time: 11/10/22 10:10 AM    Narrative    Test Reason : PRE-OP    Vent. Rate : 061 BPM     Atrial Rate : 061 BPM     P-R Int : 168 ms          QRS Dur : 086 ms      QT Int : 436 ms       P-R-T Axes : 063 079 073 degrees     QTc Int : 438 ms    Normal sinus rhythm  Normal ECG  No previous ECGs available    Referred By: KARLA OBANDO           Confirmed By:          Anesthesia Plan  Type of Anesthesia, risks & benefits discussed:    Anesthesia Type: Gen ETT, Regional  Intra-op  Monitoring Plan: Standard ASA Monitors  Post Op Pain Control Plan: multimodal analgesia  Induction:  IV  Airway Plan: Direct, Post-Induction  Informed Consent: Informed consent signed with the Patient and all parties understand the risks and agree with anesthesia plan.  All questions answered.   ASA Score: 3  Day of Surgery Review of History & Physical: H&P Update referred to the surgeon/provider.I have interviewed and examined the patient. I have reviewed the patient's H&P dated: There are no significant changes.     Ready For Surgery From Anesthesia Perspective.     .

## 2022-11-14 NOTE — ANESTHESIA PROCEDURE NOTES
Peripheral Block    Patient location during procedure: pre-op   Block not for primary anesthetic.  Reason for block: at surgeon's request and post-op pain management   Post-op Pain Location: R shoulder arthroscopy   Start time: 11/14/2022 12:34 PM  Timeout: 11/14/2022 12:33 PM   End time: 11/14/2022 12:38 PM    Staffing  Authorizing Provider: Juan Walters  Performing Provider: Juan Walters    Preanesthetic Checklist  Completed: patient identified, IV checked, site marked, risks and benefits discussed, surgical consent, monitors and equipment checked, pre-op evaluation and timeout performed  Peripheral Block  Patient position: supine  Prep: ChloraPrep  Patient monitoring: heart rate, cardiac monitor, continuous pulse ox, continuous capnometry and frequent blood pressure checks  Block type: interscalene  Laterality: right  Injection technique: single shot  Needle  Needle type: Stimuplex   Needle gauge: 22 G  Needle length: 2 in  Needle localization: anatomical landmarks, ultrasound guidance and nerve stimulator   -ultrasound image captured on disc.  Assessment  Injection assessment: negative aspiration, negative parasthesia and local visualized surrounding nerve  Paresthesia pain: none  Heart rate change: no  Slow fractionated injection: yes    Medications:    Medications: BUPivacaine (pf) (MARCAINE) injection 0.5% - Perineural   5 mL - 11/14/2022 12:38:00 PM    Additional Notes  VSS.  [facility] RN monitoring vitals throughout procedure.  Patient tolerated procedure well.      R ISB 0.% bupivicaine 5cc + exparel 10cc

## 2022-11-15 NOTE — ANESTHESIA POSTPROCEDURE EVALUATION
Anesthesia Post Evaluation    Patient: Deni Golden    Procedure(s) Performed: Procedure(s) (LRB):  REPAIR, ROTATOR CUFF, ARTHROSCOPIC (Right)  ARTHROSCOPY, SHOULDER, WITH SUBACROMIAL SPACE DECOMPRESSION (Right)  ARTHROSCOPY,SHOULDER,WITH BICEPS TENODESIS (Right)  ARTHROSCOPY, SHOULDER, WITH DISTAL CLAVICLE EXCISION (Right)    Final Anesthesia Type: general      Patient location during evaluation: PACU  Patient participation: Yes- Able to Participate  Level of consciousness: awake and alert and oriented  Post-procedure vital signs: reviewed and stable  Pain management: adequate  Airway patency: patent  SABINA mitigation strategies: Multimodal analgesia  PONV status at discharge: No PONV  Anesthetic complications: no      Cardiovascular status: hemodynamically stable  Respiratory status: unassisted and spontaneous ventilation  Hydration status: euvolemic  Follow-up not needed.          Vitals Value Taken Time   /77 11/14/22 1616   Temp 36.4 °C (97.5 °F) 11/14/22 1507   Pulse 54 11/14/22 1624   Resp 18 11/14/22 1540   SpO2 100 % 11/14/22 1624   Vitals shown include unvalidated device data.      Event Time   Out of Recovery 15:33:00         Pain/Jolly Score: Jolly Score: 10 (11/14/2022  4:15 PM)  Modified Jolly Score: 20 (11/14/2022  4:15 PM)

## 2022-11-17 DIAGNOSIS — S46.811A PARTIAL TEAR OF RIGHT SUBSCAPULARIS TENDON, INITIAL ENCOUNTER: Primary | ICD-10-CM

## 2022-11-21 ENCOUNTER — OFFICE VISIT (OUTPATIENT)
Dept: ORTHOPEDICS | Facility: CLINIC | Age: 62
End: 2022-11-21
Payer: COMMERCIAL

## 2022-11-21 DIAGNOSIS — Z98.890 S/P ARTHROSCOPY OF RIGHT SHOULDER: ICD-10-CM

## 2022-11-21 DIAGNOSIS — S46.811A PARTIAL TEAR OF RIGHT SUBSCAPULARIS TENDON, INITIAL ENCOUNTER: Primary | ICD-10-CM

## 2022-11-21 PROCEDURE — 4010F PR ACE/ARB THEARPY RXD/TAKEN: ICD-10-PCS | Mod: ,,, | Performed by: NURSE PRACTITIONER

## 2022-11-21 PROCEDURE — 99024 POSTOP FOLLOW-UP VISIT: CPT | Mod: ,,, | Performed by: NURSE PRACTITIONER

## 2022-11-21 PROCEDURE — 4010F ACE/ARB THERAPY RXD/TAKEN: CPT | Mod: ,,, | Performed by: NURSE PRACTITIONER

## 2022-11-21 PROCEDURE — 99024 PR POST-OP FOLLOW-UP VISIT: ICD-10-PCS | Mod: ,,, | Performed by: NURSE PRACTITIONER

## 2022-11-21 RX ORDER — OXYCODONE AND ACETAMINOPHEN 10; 325 MG/1; MG/1
1 TABLET ORAL EVERY 6 HOURS PRN
Qty: 30 TABLET | Refills: 0 | Status: SHIPPED | OUTPATIENT
Start: 2022-11-21 | End: 2022-12-15

## 2022-11-21 NOTE — PROGRESS NOTES
No surgery found No surgery found      Pt is here today for First post-operative followup of his shoulder arthroscopy.  he is doing well.  We have reviewed his findings and discussed plan of care and future treatment options, including the physical therapy plan.      Pt is doing good on today, he is not wearing his pillow  on today, states he will start therapy on tomorrow.  Reports he wears his sling at home but he did not wear it today.  He also admits to driving here today.  Discussed his plan of care in detail and the importance of wearing his arm sling.  His incisions look good, Steri-Strips applied.                                                                               PHYSICAL EXAMINATION:     Incision sites healed well. Sling is in appropriate position  No evidence of any erythema, infection or induration  Range of motion of the shoulder: FF 0-70, ER 0-20  Biceps tone appears appropriate      IMAGING: no new imaging    No image results found.                                                                                   ASSESSMENT:                                                                                                                                               1. Status post shoulder arthroscopy, doing well.                                                                                                                               PLAN:                                                                                                                                                     1. Continue with PT  2. Emphasized scapular stabilization to improve overall function.  3. I have discussed return to activity in detail.  4. he will see us back in 4 weeks.                                      5. All questions were answered and he should contact us if he  has any questions or concerns in the interim.            Percocet refilled    There are no Patient Instructions on file for this  visit.

## 2022-11-30 ENCOUNTER — TELEPHONE (OUTPATIENT)
Dept: ORTHOPEDICS | Facility: CLINIC | Age: 62
End: 2022-11-30
Payer: COMMERCIAL

## 2022-11-30 NOTE — TELEPHONE ENCOUNTER
----- Message from Larissa Lu sent at 11/29/2022  2:27 PM CST -----  Regarding: Call back OT  Janie is a OT at [facility] and she was needing to speak with someone about this pt. Call back [phone]

## 2022-12-15 ENCOUNTER — OFFICE VISIT (OUTPATIENT)
Dept: ORTHOPEDICS | Facility: CLINIC | Age: 62
End: 2022-12-15
Payer: COMMERCIAL

## 2022-12-15 DIAGNOSIS — Z98.890 S/P ARTHROSCOPY OF RIGHT SHOULDER: Primary | ICD-10-CM

## 2022-12-15 PROCEDURE — 99024 POSTOP FOLLOW-UP VISIT: CPT | Mod: ,,, | Performed by: NURSE PRACTITIONER

## 2022-12-15 PROCEDURE — 4010F ACE/ARB THERAPY RXD/TAKEN: CPT | Mod: ,,, | Performed by: NURSE PRACTITIONER

## 2022-12-15 PROCEDURE — 4010F PR ACE/ARB THEARPY RXD/TAKEN: ICD-10-PCS | Mod: ,,, | Performed by: NURSE PRACTITIONER

## 2022-12-15 PROCEDURE — 99024 PR POST-OP FOLLOW-UP VISIT: ICD-10-PCS | Mod: ,,, | Performed by: NURSE PRACTITIONER

## 2022-12-15 RX ORDER — HYDROCODONE BITARTRATE AND ACETAMINOPHEN 7.5; 325 MG/1; MG/1
1 TABLET ORAL EVERY 6 HOURS PRN
Qty: 25 TABLET | Refills: 0 | Status: SHIPPED | OUTPATIENT
Start: 2022-12-15

## 2022-12-15 NOTE — PROGRESS NOTES
HISTORY OF PRESENT ILLNESS:       No surgery found No surgery found      Pt is here today for Second post-operative followup of his No surgery found.  he is doing well.  We have reviewed his findings and discussed plan of care and future treatment options, including the physical therapy plan.      Patient is 4 1/2 weeks post op right shoulder arthroscopy. He has been in therapy for 3 weeks. Reports he is out of therapy visits and insurance will not approve anymore at this time. He is also requesting pain medication refill, states percocet cost too much. His incisions are well healed.                                                                                PHYSICAL EXAMINATION:     Incision sites healed well  No evidence of any erythema, infection or induration  Minimal effusion  2+ DP pulse                                                                                   ASSESSMENT:                                                                                                                                               1. Status post above, doing well.                                                                                                                               PLAN:       Wounds were treated today  DVT prophylaxis discussed  Therapy plan discussed in great detail today; all questions answered.          Norco  Continue therapy at home  We will order more therapy visits to stat in January                                                                                                                                       There are no Patient Instructions on file for this visit.

## 2022-12-28 DIAGNOSIS — Z98.890 S/P ARTHROSCOPY OF RIGHT SHOULDER: Primary | ICD-10-CM

## 2022-12-29 RX ORDER — NAPROXEN 500 MG/1
500 TABLET ORAL 2 TIMES DAILY
Qty: 60 TABLET | Refills: 0 | Status: SHIPPED | OUTPATIENT
Start: 2022-12-29

## 2022-12-29 NOTE — TELEPHONE ENCOUNTER
----- Message from May Packer sent at 12/29/2022 11:23 AM CST -----  Regarding: PAIN MEDICINE  Patient needs a refill on Pain medicine called into [facility] [address] [place] [address] pharmacy at [phone] Please call patient at [phone] if you have any questions. Thanks! Patient is completely out pain medicine.

## 2023-01-19 ENCOUNTER — OFFICE VISIT (OUTPATIENT)
Dept: ORTHOPEDICS | Facility: CLINIC | Age: 63
End: 2023-01-19
Payer: COMMERCIAL

## 2023-01-19 DIAGNOSIS — Z98.890 S/P ARTHROSCOPY OF RIGHT SHOULDER: Primary | ICD-10-CM

## 2023-01-19 PROCEDURE — 1159F PR MEDICATION LIST DOCUMENTED IN MEDICAL RECORD: ICD-10-PCS | Mod: ,,, | Performed by: ORTHOPAEDIC SURGERY

## 2023-01-19 PROCEDURE — 99024 PR POST-OP FOLLOW-UP VISIT: ICD-10-PCS | Mod: ,,, | Performed by: ORTHOPAEDIC SURGERY

## 2023-01-19 PROCEDURE — 99213 OFFICE O/P EST LOW 20 MIN: CPT | Mod: PBBFAC | Performed by: ORTHOPAEDIC SURGERY

## 2023-01-19 PROCEDURE — 1159F MED LIST DOCD IN RCRD: CPT | Mod: ,,, | Performed by: ORTHOPAEDIC SURGERY

## 2023-01-19 PROCEDURE — 1160F RVW MEDS BY RX/DR IN RCRD: CPT | Mod: ,,, | Performed by: ORTHOPAEDIC SURGERY

## 2023-01-19 PROCEDURE — 99024 POSTOP FOLLOW-UP VISIT: CPT | Mod: ,,, | Performed by: ORTHOPAEDIC SURGERY

## 2023-01-19 PROCEDURE — 1160F PR REVIEW ALL MEDS BY PRESCRIBER/CLIN PHARMACIST DOCUMENTED: ICD-10-PCS | Mod: ,,, | Performed by: ORTHOPAEDIC SURGERY

## 2023-01-19 NOTE — PROGRESS NOTES
Repair, Rotator Cuff, Arthroscopic - Right, Arthroscopy, Shoulder, With Subacromial Space Decompression - Right, Arthroscopy,shoulder,with Biceps Tenodesis - Right, and Arthroscopy, Shoulder, With Distal Clavicle Excision - Right 11/14/2022      Pt is here today for Third post-operative followup of his shoulder arthroscopy.    SANE: 60  Preop : 25    he is doing well.  He is currently doing PT at [facility] and feels he is progressing well.     We have reviewed his findings and discussed plan of care and future treatment options, including the physical therapy plan.                                                                                     PHYSICAL EXAMINATION:     Incision sites healed well. Sling is in appropriate position  No evidence of any erythema, infection or induration  Range of motion of the shoulder: 0-60 ER, 0-150 FF  Biceps tone appears appropriate      IMAGING: no new imaging    No image results found.                                                                                   ASSESSMENT:                                                                                                                                               1. Status post shoulder arthroscopy, doing well.                                                                                                                               PLAN:                                                                                                                                                     1. Continue with PT  2. Emphasized scapular stabilization to improve overall function.  3. I have discussed return to activity in detail.  4. he will see us back in 6 weeks.                                      5. All questions were answered and he should contact us if he  has any questions or concerns in the interim.              There are no Patient Instructions on file for this visit.

## (undated) DEVICE — DRESSING XEROFORM FOIL PK 1X8

## (undated) DEVICE — TUBING CROSSFLOW INFLOW CASS

## (undated) DEVICE — NDL QUINCKE SPINAL 18G 3.5IN

## (undated) DEVICE — TUBE SUCTION MEDI-VAC STERILE

## (undated) DEVICE — STOCKINETTE IMPERVIOUS LG 9X48

## (undated) DEVICE — WAND COBLATION TURBOVAC 90

## (undated) DEVICE — DRAPE SURGICAL STERILE HD SHOULDER W/POUCH 59 X 99IN

## (undated) DEVICE — POUCH INSTRUMENT 2 POCKET

## (undated) DEVICE — SLING SHOULDER ULTRASLING 3 XL

## (undated) DEVICE — SPONGE COTTON WOVEN 4X4IN

## (undated) DEVICE — SOL NACL IRR 3000ML

## (undated) DEVICE — DEVICE SUCTION H20 BROOM 12FT

## (undated) DEVICE — Device

## (undated) DEVICE — BLADE HELICUT TM

## (undated) DEVICE — GLOVE BIOGEL SKINSENSE PI 6.5

## (undated) DEVICE — CANNULA THRD DISP 8.5X72MM

## (undated) DEVICE — GOWN POLY REINF BRTH SLV XL

## (undated) DEVICE — BANDAGE COHES LTX TAN 4INX5YD

## (undated) DEVICE — GLOVE BIOGEL SKINSENSE PI 7.0

## (undated) DEVICE — DRAPE INCISE IOBAN 2 13X13IN

## (undated) DEVICE — GLOVE BIOGEL SKINSENSE PI 7.5

## (undated) DEVICE — DRAPE INVISISHIELD U 48X52IN

## (undated) DEVICE — GLOVE BIOGEL SKINSENSE PI 8.0

## (undated) DEVICE — CANNULA CLEAR TRAC 8X76X5MM

## (undated) DEVICE — STRIP MEDI WND CLSR 1/2X4IN

## (undated) DEVICE — KIT STABILIZATION SHOULDER SPIDER2

## (undated) DEVICE — KIT SHLDR POMIERSKIWATSON RUSH

## (undated) DEVICE — SHAVER SYNNOVATOR PLAT SERIES 4.5MM

## (undated) DEVICE — BLADE INCISOR + STR VIOL 4.5MM

## (undated) DEVICE — ANCHOR BIOCOMP SWVLLOK: Type: IMPLANTABLE DEVICE | Site: SHOULDER | Status: NON-FUNCTIONAL

## (undated) DEVICE — APPLICATOR CHLORAPREP ORN 26ML

## (undated) DEVICE — MARKER SKIN RULER AND LABEL

## (undated) DEVICE — WRAP SHLDR ACT 2 CLD PK L XL

## (undated) DEVICE — ADHESIVE MASTISOL VIAL 48/BX

## (undated) DEVICE — SUT MONOCRYL 3-0 PS-2 UND

## (undated) DEVICE — GOWN IMPERVIOUS BLUE XXL